# Patient Record
Sex: MALE | Race: WHITE | NOT HISPANIC OR LATINO | Employment: FULL TIME | URBAN - METROPOLITAN AREA
[De-identification: names, ages, dates, MRNs, and addresses within clinical notes are randomized per-mention and may not be internally consistent; named-entity substitution may affect disease eponyms.]

---

## 2017-11-03 ENCOUNTER — OFFICE VISIT (OUTPATIENT)
Dept: INTERNAL MEDICINE | Facility: CLINIC | Age: 22
End: 2017-11-03
Payer: COMMERCIAL

## 2017-11-03 VITALS
BODY MASS INDEX: 37.93 KG/M2 | TEMPERATURE: 98 F | WEIGHT: 250.25 LBS | HEIGHT: 68 IN | SYSTOLIC BLOOD PRESSURE: 120 MMHG | DIASTOLIC BLOOD PRESSURE: 84 MMHG | HEART RATE: 89 BPM

## 2017-11-03 DIAGNOSIS — A63.0 GENITAL WARTS: Primary | ICD-10-CM

## 2017-11-03 DIAGNOSIS — H61.22 EXCESSIVE CERUMEN IN EAR CANAL, LEFT: ICD-10-CM

## 2017-11-03 DIAGNOSIS — E66.01 SEVERE OBESITY (BMI 35.0-39.9): ICD-10-CM

## 2017-11-03 DIAGNOSIS — L02.429 FURUNCULOSIS OF THIGH: ICD-10-CM

## 2017-11-03 DIAGNOSIS — B35.6 TINEA CRURIS: Chronic | ICD-10-CM

## 2017-11-03 PROCEDURE — 99204 OFFICE O/P NEW MOD 45 MIN: CPT | Mod: S$GLB,,, | Performed by: FAMILY MEDICINE

## 2017-11-03 PROCEDURE — 99999 PR PBB SHADOW E&M-NEW PATIENT-LVL III: CPT | Mod: PBBFAC,,, | Performed by: FAMILY MEDICINE

## 2017-11-03 RX ORDER — CHLORHEXIDINE GLUCONATE 40 MG/ML
SOLUTION TOPICAL
Qty: 473 ML | Refills: 2 | Status: SHIPPED | OUTPATIENT
Start: 2017-11-03

## 2017-11-03 NOTE — PATIENT INSTRUCTIONS
Jock Itch (Tinea Cruris, General)  Jock itch (tinea cruris) is a red, itchy rash in the groin caused by a fungal infection. It occurs in skin folds where it is warm and moist. It commonly starts as a small, red, itchy patch that grows larger. The patch is usually in the shape of a round ring, 1 to 2 inches wide. It may cause the skin to flake. It may also spread to the scrotum or the skin that covers your testicles. This infection is treated with skin creams or oral medicine.  Home care  · If you were prescribed a cream, use it exactly as directed. You can buy some antifungal creams without a prescription.  · It may take a week before the fungus starts to go away. It can take about 2 to 3 weeks to completely clear. To stop the rash from coming back, keep using the medicine until the rash is all gone.  · Wash the area at least once a day with soap and water. Pat dry and apply medicine.   · Wear loose-fitting underwear to let your skin breathe. Change your underwear daily.  · Once the rash is gone, keep the area clean and dry to prevent reinfection. If recurrence is a problem, use a medicated antifungal powder daily. This is available over the counter.  Prevention  The following tips may help prevent jock itch:  · Don't share clothes, towels, or sports gear with others unless they have been washed.  · Change your underwear daily.  · Keep skin clean and dry, especially after showering or swimming.  · Lose weight.  · Do not wear tight underwear.  · Treat athlete's foot if it occurs.  Follow-up care  Follow up with your healthcare provider, or as advised. Call your provider if the rash is not starting to improve after 10 days of treatment, or if the rash continues to spread.  When to seek medical advice  Call your healthcare provider right away if any of these occur:  · Increasing pain in the rash area  · Redness that spreads around the rash  · Fluid draining from the rash  · Rash returns soon after treatment  · Fever  of 100.4°F (38°C) or higher, or as directed by your provider  Date Last Reviewed: 8/1/2016  © 7229-4883 [employer]. [address] [place] [address], [place] [address]. All rights reserved. This information is not intended as a substitute for professional medical care. Always follow your healthcare professional's instructions.        Treating Genital Warts  Warts sometimes go away on their own, remain unchanged, or increase in size and number. Depending on where the warts are, some treatments may work better than others. However, even though these treatments may remove the wart, the virus that caused the wart usually remains in the skin.  Medicines     Medicines can be applied to break warts apart.   Prescription creams and gels can be applied to warts and surrounding skin. Some prompt your immune system to rally against HPV (human papillomavirus), the virus that causes genital warts. Others are caustic agents that destroy warts. Medicines can be applied at the health care provider's office or at home. Often, more than one dose is needed. These treatments sometimes cause skin rashes. Talk to your healthcare provider about possible side effects.     Wart removal     Warts may be removed using a heated wire loop.   Warts can be removed in a number of ways. These include freezing, heat (cautery), lasers, and surgery. These procedures are done by your regular healthcare provider or a specialist. Before treatment, you may receive local anesthesia to numb the area. The number of treatments depends on how many warts are being removed. Your healthcare provider can give you more details.     Other treatment options for genital warts  As more is learned about HPV, new treatments are being developed to help the body defend itself. Your healthcare provider can tell you more about treatments that may someday be available. There is also a vaccine that can prevent HPV in young men and women before you even have the virus.  Your healthcare provider can tell you more.   Date Last Reviewed: 1/1/2017  © 4087-6266 [employer]. [address] [place] [address], [place] [address]. All rights reserved. This information is not intended as a substitute for professional medical care. Always follow your healthcare professional's instructions.        Earwax (Treated)    Everyone produces earwax from the lining of the ear canal. It lubricates and protects the ear. The wax that forms in the canal slowly moves toward the outside of the ear and falls out. Sometimes wax can build up in the ear canal. This can cause a blockage and loss of hearing. A buildup of earwax was removed from your ear today.  Home care  If you have a tendency to build up wax in the ear canal, you should clear the wax at home regularly, before it causes discomfort. This should be about once every six months.  · Unless a medicine was prescribed, you may use an over-the-counter product made for clearing earwax. These contain carbamide peroxide and are available over-the-counter in a kit with a small bulb syringe.  · Lie down with the blocked ear facing upward. Apply one dropper full of medicine and wait a few minutes. Grasp the outer ear and wiggle it to help the solution enter the canal.  · Lean over a sink or basin with the blocked ear turned downward. Use a rubber bulb syringe filled with warm (not hot or cold) water to rinse the ear several times. Use gentle pressure only. You may need to repeat the irrigation several times before the wax flows out.  · If you are having trouble draining all the water out of your ear canal, put a few drops of rubbing alcohol into the ear canal. This will help remove the remaining water.  Don'ts  · Dont use cold water to rinse the ear. This will make you dizzy.  · Dont do this procedure if you have an ear infection. Symptoms include ear pain, fever, or fluid draining from the ear.  · Dont do this procedure if you have a punctured  eardrum.  · Dont use cotton swabs, matches, hairpins, keys, or other objects to clean the ear canal. This can cause infection of the ear canal or rupture of the eardrum. Because of their size and shape, cotton swabs can push the earwax deeper into the ear canal instead of removing it.  Follow-up care  Follow up with your healthcare provider, or as advised.  When to seek medical advice  Call your healthcare provider right away if any of these occur:  · Worsening ear pain  · Fever of 100.4°F (38°C) or higher, or as directed by your healthcare provider  · Hearing does not return to normal after three days of treatment  · Fluid drainage or bleeding from the ear canal  · Swelling, redness, or tenderness of the outer ear  · Headache, neck pain, or stiff neck  Date Last Reviewed: 3/22/2015  © 6590-5197 [employer]. [address] [place] [address]. All rights reserved. This information is not intended as a substitute for professional medical care. Always follow your healthcare professional's instructions.

## 2017-11-06 ENCOUNTER — OFFICE VISIT (OUTPATIENT)
Dept: INTERNAL MEDICINE | Facility: CLINIC | Age: 22
End: 2017-11-06
Payer: COMMERCIAL

## 2017-11-06 VITALS
OXYGEN SATURATION: 98 % | HEART RATE: 93 BPM | TEMPERATURE: 98 F | WEIGHT: 250.44 LBS | DIASTOLIC BLOOD PRESSURE: 68 MMHG | HEIGHT: 68 IN | BODY MASS INDEX: 37.96 KG/M2 | SYSTOLIC BLOOD PRESSURE: 110 MMHG

## 2017-11-06 DIAGNOSIS — A63.0 GENITAL WARTS: Primary | ICD-10-CM

## 2017-11-06 PROCEDURE — 17000 DESTRUCT PREMALG LESION: CPT | Mod: S$GLB,,, | Performed by: FAMILY MEDICINE

## 2017-11-06 PROCEDURE — 17003 DESTRUCT PREMALG LES 2-14: CPT | Mod: S$GLB,,, | Performed by: FAMILY MEDICINE

## 2017-11-06 PROCEDURE — 99999 PR PBB SHADOW E&M-EST. PATIENT-LVL III: CPT | Mod: PBBFAC,,, | Performed by: FAMILY MEDICINE

## 2017-11-06 PROCEDURE — 99211 OFF/OP EST MAY X REQ PHY/QHP: CPT | Mod: 25,S$GLB,, | Performed by: FAMILY MEDICINE

## 2017-11-06 RX ORDER — LIDOCAINE HYDROCHLORIDE 10 MG/ML
5 INJECTION INFILTRATION; PERINEURAL
Status: ACTIVE | OUTPATIENT
Start: 2017-11-06

## 2017-11-06 NOTE — Clinical Note
Shelly Sherman.  Please review my documentation and check the accuracy of my coding for this encounter and give me feedback via email to [email or web address].  Thanks for your help!  KASHMIR Ojeda MD

## 2017-11-07 NOTE — PROGRESS NOTES
"HISTORY OF PRESENT ILLNESS  PROBLEM/CONDITION: He comes in for treatment of genital warts. At his previous appointment, I provided him patient education information on risks and benefits of various treatment options for genital warts. It was mutually agreed to treat his warts with excision and thermal cautery.     PHYSICAL EXAM  Vitals:    11/06/17 1546   BP: 110/68   BP Location: Right arm   Patient Position: Sitting   BP Method: Large (Manual)   Pulse: 93   Temp: 97.6 °F (36.4 °C)   TempSrc: Oral   SpO2: 98%   Weight: 113.6 kg (250 lb 7.1 oz)   Height: 5' 8" (1.727 m)     CONSTITUTIONAL: Vital signs noted. No apparent distress. Does not appear acutely ill or septic. Appears adequately hydrated.  PULM: Breathing unlabored.  HEART: Regular.  DERM: Skin normothermic with normal turgor. Four genital area warts were identified. In the right superior aspect of his pubic region was a tan-pink verrucous papule with a maximum diameter of approximately 0.8 cm. There were 3 smaller tan verrucous papules, each with a maximum diameter of approximately 0.3 cm, located in his pubic area, within a centimeter of the base of his penis, one at approximately 12 o'clock, another at approximately 1 o'clock, and another at approximately 2 o'clock.  PSYCHIATRIC: Alert and oriented x 3. Mood is grossly neutral. Affect appropriate. Judgment and insight not grossly compromised.       *PROCEDURE: DESTRUCTION OF 4 WARTS   *DIAGNOSIS: genital warts   *INFORMED CONSENT: Obtained verbally      *PRE-PROCEDURE SAFETY CHECK:  (1) Patient was patient identified by at least 2 means.  (2) Planned procedure was confirmed.  (3) Correct sites were verified.  (4) Anticipated needed supplies were verified as available.   *PROCEDURE DESCRIPTION:  The entire pubic area was prepped with alcohol. Each of the warts was anesthetized with approximately 1 mL of 1% lidocaine without epinephrine. Using sterile Iris scissors, each wart was excised at its base, " "and then the base was cauterized with thermal cautery.   *PATIENT TOLERANCE OF PROCEDURE:  Good   *ESTIMATED BLOOD LOSS: less than 1 mL   *POST-OP HEMOSTASIS: Observed   *COMPLICATIONS:  none    Post-op wound care instructions reviewed with patient.    PAST MEDICAL HISTORY, FAMILY HISTORY, SOCIAL HISTORY, CURRENT MEDICATION LIST, and ALLERGY LIST reviewed by me (KASHMIR Ojeda MD) and are updated consistent with the patient's report.    ASSESSMENT and PLAN  Genital warts  -     lidocaine HCL 10 mg/ml (1%) injection 5 mL; Inject 5 mLs into the skin one time.      ABOUT THIS DOCUMENTATION:  · The order of the conditions listed in the HPI is one of convenience and does not necessarily reflect the chronology of the appointment, nor the relative importance of a condition. It is possible that additional description or status details about condition(s) may be found elsewhere in the documentation for today's encounter.  · Documentation entered by me for this encounter was done in part using speech-recognition technology. Although I have made an effort to ensure accuracy, "sound like" errors may exist and should be interpreted in context.                        -KASHMIR Ojeda MD    "

## 2017-11-13 PROBLEM — H61.22 EXCESSIVE CERUMEN IN EAR CANAL, LEFT: Status: ACTIVE | Noted: 2017-11-03

## 2017-11-13 NOTE — PROGRESS NOTES
HEALTH MAINTENANCE REVIEW  Health Maintenance   Topic Date Due    Lipid Panel  1994    TETANUS VACCINE  02/05/2012    Influenza Vaccine  08/01/2017        HEALTH MAINTENANCE INTERVENTIONS - DUE OR DUE SOON  Health Maintenance Due   Topic Date Due    Lipid Panel  1994    TETANUS VACCINE  02/05/2012    Influenza Vaccine  08/01/2017       FUTURE APPOINTMENTS  No future appointments.    CHIEF COMPLAINT  Genital Warts      HISTORY OF PRESENT ILLNESS  PROBLEM/CONDITION: Primary complaint is warts. LOCATION is in his pubic area, but not on his penis or scrotum. QUALITY is painless. ONSET was over the last couple of months. Severity is MILD, with 4 to 5 small warts identified. On exam, he has typical tan-pink Lidya's papules consistent with genital warts. I provided him patient education information on treatment options.    PROBLEM/CONDITION: He has typical erythematous pruritic rash in his inguinal Fitch bilaterally consistent with tinea Fam. I educated him on hygiene measures and treatment options.    PROBLEM/CONDITION: He reports recurrent boils of his medial thighs, but not in his inguinal regions. EXACERBATING FACTORS include heat and recent weight gain, causing his thighs to rub together. He has no active furuncles at present. I educated him on appropriate hygiene measures.    PROBLEM/CONDITION: He continues to struggle with obesity. Therapeutic lifestyle changes encouraged.    PROBLEM/CONDITION: Not including excess cerumen noted in left ear canal. I educated him on ear hygiene and instructed him to use over-the-counter earwax removal kit as directed.    No other complaints or concerns reported.    Problem List Items Addressed This Visit     Furunculosis of thigh    Relevant Medications    chlorhexidine (HIBICLENS) 4 % external liquid    Excessive cerumen in ear canal, left    Genital warts - Primary (Chronic)    Tinea cruris (Chronic)    Relevant Medications    terbinafine HCl  "(LAMISIL) 1 % cream    Class 2 obesity (BMI 35.0-39.9) (Chronic)          REVIEW OF SYSTEMS  CONSTITUTIONAL: No fever reported.  CARDIOVASCULAR: No angina reported.  PULMONARY: No hemoptysis reported.  PSYCHIATRIC: No mood instability reported.  NEUROLOGIC: No seizures reported.  ENDOCRINE: No polydipsia reported.  GASTROINTESTINAL: No blood in stool reported.  GENITOURINARY: No hematuria reported.  ENT: No acute hearing changes reported.  OPHTHALMOLOGIC: No acute vision changes reported.  HEMATOLOGIC: No bleeding problems reported.  MUSCULOSKELETAL: No recent injuries reported.  DERMATOLOGIC: No skin infection reported.  REMAINDER OF COMPLETE REVIEW OF SYSTEMS is negative or noncontributory to present illness except as noted herein.     PHYSICAL EXAM  Vitals:    11/03/17 1157   BP: 120/84   Pulse: 89   Temp: 97.9 °F (36.6 °C)   TempSrc: Oral   Weight: 113.5 kg (250 lb 3.6 oz)   Height: 5' 8" (1.727 m)     CONSTITUTIONAL: Vital signs noted. No apparent distress. Does not appear acutely ill or septic. Appears adequately hydrated.  EYE: Sclerae anicteric. Lids and conjunctiva unremarkable.  ENT: External ENT unremarkable. Oropharynx moist. Description of exam of this system is further documented above in HPI.   NECK: Trachea midline. Thyroid nontender.  PULM: Lungs clear. Breathing unlabored.  CV: Auscultation reveals regular rate and rhythm without murmur, gallop or rub. No carotid bruit.   GI: Soft and nontender. Bowel sounds normal.  DERM: Skin warm and moist with normal turgor. Description of exam of this system is further documented above in HPI.   NEURO: There are no gross focal motor deficits or gross deficits of cranial nerves III-XII.  PSYCH: Alert and oriented x 3. Mood is grossly neutral. Affect appropriate. Judgment and insight not grossly compromised.  MSK: Grossly normal stance and gait.      PAST MEDICAL HISTORY, FAMILY HISTORY, SOCIAL HISTORY, CURRENT MEDICATION LIST, and " "ALLERGY LIST reviewed by me (KASHMIR Ojeda MD) and are updated consistent with the patient's report.    ASSESSMENT and PLAN  Genital warts    Tinea cruris  -     terbinafine HCl (LAMISIL) 1 % cream; Apply to rash twice daily for 21 days  Dispense: 30 g; Refill: 1    Furunculosis of thigh  -     chlorhexidine (HIBICLENS) 4 % external liquid; Use as directed  Dispense: 473 mL; Refill: 2    Class 2 obesity (BMI 35.0-39.9)    Excessive cerumen in ear canal, left        Medication List with Changes/Refills   New Medications    CHLORHEXIDINE (HIBICLENS) 4 % EXTERNAL LIQUID    Use as directed    TERBINAFINE HCL (LAMISIL) 1 % CREAM    Apply to rash twice daily for 21 days       Return in about 5 days (around 11/8/2017) for destruction of warts.    ABOUT THIS DOCUMENTATION:  · The order of the conditions listed in the HPI is one of convenience and does not necessarily reflect the chronology of the appointment, nor the relative importance of a condition. It is possible that additional description or status details about condition(s) may be found elsewhere in the documentation for today's encounter.  · Documentation entered by me for this encounter was done in part using speech-recognition technology. Although I have made an effort to ensure accuracy, "sound like" errors may exist and should be interpreted in context.                        -KASHMIR Ojeda MD    Patient Instructions       Jock Itch (Tinea Cruris, General)  Jock itch (tinea cruris) is a red, itchy rash in the groin caused by a fungal infection. It occurs in skin folds where it is warm and moist. It commonly starts as a small, red, itchy patch that grows larger. The patch is usually in the shape of a round ring, 1 to 2 inches wide. It may cause the skin to flake. It may also spread to the scrotum or the skin that covers your testicles. This infection is treated with skin creams or oral medicine.  Home care  · If you were prescribed a cream, use it " exactly as directed. You can buy some antifungal creams without a prescription.  · It may take a week before the fungus starts to go away. It can take about 2 to 3 weeks to completely clear. To stop the rash from coming back, keep using the medicine until the rash is all gone.  · Wash the area at least once a day with soap and water. Pat dry and apply medicine.   · Wear loose-fitting underwear to let your skin breathe. Change your underwear daily.  · Once the rash is gone, keep the area clean and dry to prevent reinfection. If recurrence is a problem, use a medicated antifungal powder daily. This is available over the counter.  Prevention  The following tips may help prevent jock itch:  · Don't share clothes, towels, or sports gear with others unless they have been washed.  · Change your underwear daily.  · Keep skin clean and dry, especially after showering or swimming.  · Lose weight.  · Do not wear tight underwear.  · Treat athlete's foot if it occurs.  Follow-up care  Follow up with your healthcare provider, or as advised. Call your provider if the rash is not starting to improve after 10 days of treatment, or if the rash continues to spread.  When to seek medical advice  Call your healthcare provider right away if any of these occur:  · Increasing pain in the rash area  · Redness that spreads around the rash  · Fluid draining from the rash  · Rash returns soon after treatment  · Fever of 100.4°F (38°C) or higher, or as directed by your provider  Date Last Reviewed: 8/1/2016  © 2818-1488 [employer]. [address] [place] [address], [place] [address]. All rights reserved. This information is not intended as a substitute for professional medical care. Always follow your healthcare professional's instructions.        Treating Genital Warts  Warts sometimes go away on their own, remain unchanged, or increase in size and number. Depending on where the warts are, some treatments may work better than  others. However, even though these treatments may remove the wart, the virus that caused the wart usually remains in the skin.  Medicines     Medicines can be applied to break warts apart.   Prescription creams and gels can be applied to warts and surrounding skin. Some prompt your immune system to rally against HPV (human papillomavirus), the virus that causes genital warts. Others are caustic agents that destroy warts. Medicines can be applied at the health care provider's office or at home. Often, more than one dose is needed. These treatments sometimes cause skin rashes. Talk to your healthcare provider about possible side effects.     Wart removal     Warts may be removed using a heated wire loop.   Warts can be removed in a number of ways. These include freezing, heat (cautery), lasers, and surgery. These procedures are done by your regular healthcare provider or a specialist. Before treatment, you may receive local anesthesia to numb the area. The number of treatments depends on how many warts are being removed. Your healthcare provider can give you more details.     Other treatment options for genital warts  As more is learned about HPV, new treatments are being developed to help the body defend itself. Your healthcare provider can tell you more about treatments that may someday be available. There is also a vaccine that can prevent HPV in young men and women before you even have the virus. Your healthcare provider can tell you more.   Date Last Reviewed: 1/1/2017  © 3219-7441 [employer]. [address] [place] [address]. All rights reserved. This information is not intended as a substitute for professional medical care. Always follow your healthcare professional's instructions.        Earwax (Treated)    Everyone produces earwax from the lining of the ear canal. It lubricates and protects the ear. The wax that forms in the canal slowly moves toward the outside of the ear and falls  out. Sometimes wax can build up in the ear canal. This can cause a blockage and loss of hearing. A buildup of earwax was removed from your ear today.  Home care  If you have a tendency to build up wax in the ear canal, you should clear the wax at home regularly, before it causes discomfort. This should be about once every six months.  · Unless a medicine was prescribed, you may use an over-the-counter product made for clearing earwax. These contain carbamide peroxide and are available over-the-counter in a kit with a small bulb syringe.  · Lie down with the blocked ear facing upward. Apply one dropper full of medicine and wait a few minutes. Grasp the outer ear and wiggle it to help the solution enter the canal.  · Lean over a sink or basin with the blocked ear turned downward. Use a rubber bulb syringe filled with warm (not hot or cold) water to rinse the ear several times. Use gentle pressure only. You may need to repeat the irrigation several times before the wax flows out.  · If you are having trouble draining all the water out of your ear canal, put a few drops of rubbing alcohol into the ear canal. This will help remove the remaining water.  Don'ts  · Dont use cold water to rinse the ear. This will make you dizzy.  · Dont do this procedure if you have an ear infection. Symptoms include ear pain, fever, or fluid draining from the ear.  · Dont do this procedure if you have a punctured eardrum.  · Dont use cotton swabs, matches, hairpins, keys, or other objects to clean the ear canal. This can cause infection of the ear canal or rupture of the eardrum. Because of their size and shape, cotton swabs can push the earwax deeper into the ear canal instead of removing it.  Follow-up care  Follow up with your healthcare provider, or as advised.  When to seek medical advice  Call your healthcare provider right away if any of these occur:  · Worsening ear pain  · Fever of 100.4°F (38°C) or higher, or as directed by  your healthcare provider  · Hearing does not return to normal after three days of treatment  · Fluid drainage or bleeding from the ear canal  · Swelling, redness, or tenderness of the outer ear  · Headache, neck pain, or stiff neck  Date Last Reviewed: 3/22/2015  © 1988-0037 [employer]. [address] [place] [address]. All rights reserved. This information is not intended as a substitute for professional medical care. Always follow your healthcare professional's instructions.

## 2017-12-04 ENCOUNTER — TELEPHONE (OUTPATIENT)
Dept: INTERNAL MEDICINE | Facility: CLINIC | Age: 22
End: 2017-12-04

## 2017-12-04 NOTE — TELEPHONE ENCOUNTER
----- Message from KASHMIR Ojeda MD sent at 11/3/2017 12:30 PM CDT -----  Please request ER and Lab records from [facility] within the last 60 days.